# Patient Record
Sex: MALE | Race: BLACK OR AFRICAN AMERICAN | NOT HISPANIC OR LATINO | Employment: FULL TIME | URBAN - METROPOLITAN AREA
[De-identification: names, ages, dates, MRNs, and addresses within clinical notes are randomized per-mention and may not be internally consistent; named-entity substitution may affect disease eponyms.]

---

## 2024-02-05 ENCOUNTER — APPOINTMENT (OUTPATIENT)
Dept: RADIOLOGY | Facility: HOSPITAL | Age: 34
End: 2024-02-05
Payer: MEDICAID

## 2024-02-05 ENCOUNTER — HOSPITAL ENCOUNTER (EMERGENCY)
Facility: HOSPITAL | Age: 34
Discharge: LEFT BEFORE DISCHARGE | End: 2024-02-05
Payer: MEDICAID

## 2024-02-05 VITALS
WEIGHT: 150 LBS | BODY MASS INDEX: 24.99 KG/M2 | TEMPERATURE: 97.7 F | RESPIRATION RATE: 17 BRPM | HEART RATE: 63 BPM | DIASTOLIC BLOOD PRESSURE: 79 MMHG | HEIGHT: 65 IN | OXYGEN SATURATION: 96 % | SYSTOLIC BLOOD PRESSURE: 118 MMHG

## 2024-02-05 DIAGNOSIS — R10.84 ABDOMINAL PAIN, GENERALIZED: Primary | ICD-10-CM

## 2024-02-05 LAB
ALBUMIN SERPL BCP-MCNC: 4.4 G/DL (ref 3.4–5)
ALP SERPL-CCNC: 84 U/L (ref 33–120)
ALT SERPL W P-5'-P-CCNC: 20 U/L (ref 10–52)
ANION GAP SERPL CALC-SCNC: 11 MMOL/L (ref 10–20)
APPEARANCE UR: CLEAR
AST SERPL W P-5'-P-CCNC: 18 U/L (ref 9–39)
BASOPHILS # BLD AUTO: 0.02 X10*3/UL (ref 0–0.1)
BASOPHILS NFR BLD AUTO: 0.2 %
BILIRUB SERPL-MCNC: 0.9 MG/DL (ref 0–1.2)
BILIRUB UR STRIP.AUTO-MCNC: NEGATIVE MG/DL
BUN SERPL-MCNC: 11 MG/DL (ref 6–23)
CALCIUM SERPL-MCNC: 9.6 MG/DL (ref 8.6–10.6)
CHLORIDE SERPL-SCNC: 107 MMOL/L (ref 98–107)
CO2 SERPL-SCNC: 29 MMOL/L (ref 21–32)
COLOR UR: YELLOW
CREAT SERPL-MCNC: 0.84 MG/DL (ref 0.5–1.3)
EGFRCR SERPLBLD CKD-EPI 2021: >90 ML/MIN/1.73M*2
EOSINOPHIL # BLD AUTO: 0.62 X10*3/UL (ref 0–0.7)
EOSINOPHIL NFR BLD AUTO: 7.7 %
ERYTHROCYTE [DISTWIDTH] IN BLOOD BY AUTOMATED COUNT: 11.9 % (ref 11.5–14.5)
GLUCOSE SERPL-MCNC: 81 MG/DL (ref 74–99)
GLUCOSE UR STRIP.AUTO-MCNC: NEGATIVE MG/DL
HCT VFR BLD AUTO: 47.8 % (ref 41–52)
HGB BLD-MCNC: 17.1 G/DL (ref 13.5–17.5)
HIV 1+2 AB+HIV1 P24 AG SERPL QL IA: NONREACTIVE
IMM GRANULOCYTES # BLD AUTO: 0.02 X10*3/UL (ref 0–0.7)
IMM GRANULOCYTES NFR BLD AUTO: 0.2 % (ref 0–0.9)
KETONES UR STRIP.AUTO-MCNC: NEGATIVE MG/DL
LEUKOCYTE ESTERASE UR QL STRIP.AUTO: ABNORMAL
LIPASE SERPL-CCNC: 9 U/L (ref 9–82)
LYMPHOCYTES # BLD AUTO: 1.51 X10*3/UL (ref 1.2–4.8)
LYMPHOCYTES NFR BLD AUTO: 18.8 %
MCH RBC QN AUTO: 30.4 PG (ref 26–34)
MCHC RBC AUTO-ENTMCNC: 35.8 G/DL (ref 32–36)
MCV RBC AUTO: 85 FL (ref 80–100)
MONOCYTES # BLD AUTO: 0.75 X10*3/UL (ref 0.1–1)
MONOCYTES NFR BLD AUTO: 9.3 %
MUCOUS THREADS #/AREA URNS AUTO: ABNORMAL /LPF
NEUTROPHILS # BLD AUTO: 5.12 X10*3/UL (ref 1.2–7.7)
NEUTROPHILS NFR BLD AUTO: 63.8 %
NITRITE UR QL STRIP.AUTO: NEGATIVE
NRBC BLD-RTO: 0 /100 WBCS (ref 0–0)
PH UR STRIP.AUTO: 6 [PH]
PLATELET # BLD AUTO: 201 X10*3/UL (ref 150–450)
POTASSIUM SERPL-SCNC: 4 MMOL/L (ref 3.5–5.3)
PROT SERPL-MCNC: 7.1 G/DL (ref 6.4–8.2)
PROT UR STRIP.AUTO-MCNC: NEGATIVE MG/DL
RBC # BLD AUTO: 5.62 X10*6/UL (ref 4.5–5.9)
RBC # UR STRIP.AUTO: NEGATIVE /UL
RBC #/AREA URNS AUTO: ABNORMAL /HPF
SODIUM SERPL-SCNC: 143 MMOL/L (ref 136–145)
SP GR UR STRIP.AUTO: 1.01
TREPONEMA PALLIDUM IGG+IGM AB [PRESENCE] IN SERUM OR PLASMA BY IMMUNOASSAY: NONREACTIVE
UROBILINOGEN UR STRIP.AUTO-MCNC: <2 MG/DL
WBC # BLD AUTO: 8 X10*3/UL (ref 4.4–11.3)
WBC #/AREA URNS AUTO: ABNORMAL /HPF

## 2024-02-05 PROCEDURE — 74177 CT ABD & PELVIS W/CONTRAST: CPT | Performed by: RADIOLOGY

## 2024-02-05 PROCEDURE — 36415 COLL VENOUS BLD VENIPUNCTURE: CPT

## 2024-02-05 PROCEDURE — 2500000004 HC RX 250 GENERAL PHARMACY W/ HCPCS (ALT 636 FOR OP/ED): Mod: SE

## 2024-02-05 PROCEDURE — 81003 URINALYSIS AUTO W/O SCOPE: CPT

## 2024-02-05 PROCEDURE — 99284 EMERGENCY DEPT VISIT MOD MDM: CPT | Mod: 25

## 2024-02-05 PROCEDURE — 85025 COMPLETE CBC W/AUTO DIFF WBC: CPT

## 2024-02-05 PROCEDURE — 74177 CT ABD & PELVIS W/CONTRAST: CPT

## 2024-02-05 PROCEDURE — 87086 URINE CULTURE/COLONY COUNT: CPT

## 2024-02-05 PROCEDURE — 2550000001 HC RX 255 CONTRASTS: Mod: SE

## 2024-02-05 PROCEDURE — 87800 DETECT AGNT MULT DNA DIREC: CPT

## 2024-02-05 PROCEDURE — 96374 THER/PROPH/DIAG INJ IV PUSH: CPT | Mod: 59

## 2024-02-05 PROCEDURE — 86780 TREPONEMA PALLIDUM: CPT

## 2024-02-05 PROCEDURE — 80053 COMPREHEN METABOLIC PANEL: CPT

## 2024-02-05 PROCEDURE — 87389 HIV-1 AG W/HIV-1&-2 AB AG IA: CPT

## 2024-02-05 PROCEDURE — 83690 ASSAY OF LIPASE: CPT

## 2024-02-05 RX ORDER — MORPHINE SULFATE 4 MG/ML
4 INJECTION INTRAVENOUS ONCE
Status: COMPLETED | OUTPATIENT
Start: 2024-02-05 | End: 2024-02-05

## 2024-02-05 RX ADMIN — MORPHINE SULFATE 4 MG: 4 INJECTION INTRAVENOUS at 15:07

## 2024-02-05 RX ADMIN — IOHEXOL 90 ML: 350 INJECTION, SOLUTION INTRAVENOUS at 15:07

## 2024-02-05 ASSESSMENT — PAIN SCALES - GENERAL: PAINLEVEL_OUTOF10: 10 - WORST POSSIBLE PAIN

## 2024-02-05 ASSESSMENT — PAIN - FUNCTIONAL ASSESSMENT: PAIN_FUNCTIONAL_ASSESSMENT: 0-10

## 2024-02-05 ASSESSMENT — COLUMBIA-SUICIDE SEVERITY RATING SCALE - C-SSRS
6. HAVE YOU EVER DONE ANYTHING, STARTED TO DO ANYTHING, OR PREPARED TO DO ANYTHING TO END YOUR LIFE?: NO
1. IN THE PAST MONTH, HAVE YOU WISHED YOU WERE DEAD OR WISHED YOU COULD GO TO SLEEP AND NOT WAKE UP?: NO
2. HAVE YOU ACTUALLY HAD ANY THOUGHTS OF KILLING YOURSELF?: NO

## 2024-02-05 NOTE — ED PROVIDER NOTES
Emergency Department Encounter  [facility] EMERGENCY MEDICINE    Patient: Mumtaz Romo  MRN: [id number]  : 1990  Date of Evaluation: 2024  ED Provider: Mayco Turcios PA-C    Chief Complaint       Chief Complaint   Patient presents with    Abdominal Pain     [place]    (Location/Symptom, Timing/Onset, Context/Setting, Quality, Duration, Modifying Factors, Severity) Note limiting factors.     Mumtaz Romo is a 33 y.o. male who presents to the emergency department for abdominal pain that started just prior to arrival.  Most abdominal pain is periumbilical region.  Describes the pain as sharp.  Denies any nausea, vomiting, fever, chills.  Denies any previous abdominal surgeries.  Patient is also here for STD testing.  Denies any urethral discharge, dysuria, frequency, genital rashes.  Patient just wants to be tested.    Limitations to History: none  Records reviewed: EMR    Past History   No past medical history on file.  Past Surgical History:   Procedure Laterality Date    OTHER SURGICAL HISTORY  10/02/2020    No history of surgery     Social History     Socioeconomic History    Marital status: Single     Spouse name: Not on file    Number of children: Not on file    Years of education: Not on file    Highest education level: Not on file   Occupational History    Not on file   Tobacco Use    Smoking status: Not on file    Smokeless tobacco: Not on file   Substance and Sexual Activity    Alcohol use: Not on file    Drug use: Not on file    Sexual activity: Not on file   Other Topics Concern    Not on file   Social History Narrative    Not on file     Social Determinants of Health     Financial Resource Strain: Not on file   Food Insecurity: Not on file   Transportation Needs: Not on file   Physical Activity: Not on file   Stress: Not on file   Social Connections: Not on file   Intimate Partner Violence: Not on file   Housing Stability: Not on file         Medications/Allergies      Previous Medications    No medications on file     Allergies   Allergen Reactions    Egg Derived Swelling     States throat swells up.     States throat swells up.    Shellfish Containing Products Shortness of breath and Swelling     Not all shellfish but shrimp for sure.    Acetaminophen Hives    Ibuprofen Hives    Egg Unknown    Shellfish Derived Swelling        Physical Exam       ED Triage Vitals [02/05/24 1347]   Temperature Heart Rate Respirations BP   36.5 °C (97.7 °F) 63 17 118/79      Pulse Ox Temp Source Heart Rate Source Patient Position   96 % Temporal Monitor --      BP Location FiO2 (%)     -- --       Physical Exam  GENERAL APPEARANCE: Awake and alert. Cooperative.   HEENT: Normocephalic. Atraumatic. Sclera anicteric. Tolerates saliva. No trismus.   NECK: Supple. Trachea midline.   CARDIO: Normal rate. Radial pulses symmetrical and palpable  LUNGS: Respirations unlabored. CTAB.  ABDOMEN: Soft. Non-distended.  Most pain in periumbilical region of abdomen.  No peritoneal signs or guarding.  MUSCULOSKELETAL: No acute deformities.   SKIN: Warm and dry.   NEUROLOGICAL: No gross facial drooping. No obvious neurologic deficits.  strength symmetrical. Moves all 4 extremities spontaneously.       Diagnostics   Labs:  Labs Reviewed   URINALYSIS WITH REFLEX CULTURE AND MICROSCOPIC - Abnormal       Result Value    Color, Urine Yellow      Appearance, Urine Clear      Specific Gravity, Urine 1.014      pH, Urine 6.0      Protein, Urine NEGATIVE      Glucose, Urine NEGATIVE      Blood, Urine NEGATIVE      Ketones, Urine NEGATIVE      Bilirubin, Urine NEGATIVE      Urobilinogen, Urine <2.0      Nitrite, Urine NEGATIVE      Leukocyte Esterase, Urine TRACE (*)    MICROSCOPIC ONLY, URINE - Abnormal    WBC, Urine 6-10 (*)     RBC, Urine NONE      Mucus, Urine 1+     COMPREHENSIVE METABOLIC PANEL - Normal    Glucose 81      Sodium 143      Potassium 4.0      Chloride 107      Bicarbonate 29      Anion Gap 11       Urea Nitrogen 11      Creatinine 0.84      eGFR >90      Calcium 9.6      Albumin 4.4      Alkaline Phosphatase 84      Total Protein 7.1      AST 18      Bilirubin, Total 0.9      ALT 20     LIPASE - Normal    Lipase 9      Narrative:     Venipuncture immediately after or during the administration of Metamizole may lead to falsely low results. Testing should be performed immediately prior to Metamizole dosing.   SYPHILIS SCREENING WITH REFLEX - Normal    Syphilis Total Ab Nonreactive     HIV 1/2 ANTIGEN/ANTIBODY SCREEN WIH REFLEX TO CONFIRMATION - Normal    HIV 1/2 Antigen/Antibody Screen with Reflex to Confirmation Nonreactive      Narrative:     HIV Ag/Ab screen is performed using the Siemens [employer] HIV Ag/Ab Combo assay which detects the presence of HIV p24 antigen as well as antibodies to HIV-1 (Group M and O) and HIV-2.  No laboratory evidence of HIV infection. If acute HIV infection is suspected, consider testing for HIV RNA by PCR (viral load).   URINE CULTURE   CBC WITH AUTO DIFFERENTIAL    WBC 8.0      nRBC 0.0      RBC 5.62      Hemoglobin 17.1      Hematocrit 47.8      MCV 85      MCH 30.4      MCHC 35.8      RDW 11.9      Platelets 201      Neutrophils % 63.8      Immature Granulocytes %, Automated 0.2      Lymphocytes % 18.8      Monocytes % 9.3      Eosinophils % 7.7      Basophils % 0.2      Neutrophils Absolute 5.12      Immature Granulocytes Absolute, Automated 0.02      Lymphocytes Absolute 1.51      Monocytes Absolute 0.75      Eosinophils Absolute 0.62      Basophils Absolute 0.02     URINALYSIS WITH REFLEX CULTURE AND MICROSCOPIC    Narrative:     The following orders were created for panel order Urinalysis with Reflex Culture and Microscopic.  Procedure                               Abnormality         Status                     ---------                               -----------         ------                     Urinalysis with Reflex C...[[id number]]  Abnormal            Final result                Extra Urine Gray Tube[[id number]]                            In process                   Please view results for these tests on the individual orders.   C. TRACHOMATIS + N. GONORRHOEAE, AMPLIFIED   EXTRA URINE GRAY TUBE     Radiographs:  CT abdomen pelvis w IV contrast   Final Result   1. Fluid-filled small bowel loops extending up to the ascending colon   involving the appendix with trace abdominopelvic ascites may   represent underlying enterocolitis.   2. Additional incidental findings as detailed above.        I personally reviewed the images/study and I agree with the findings   as stated by Dr. Diaz Tomlin. This study was interpreted at [place], [place].        MACRO:   None.        Signed by: Dio Delgado 2/5/2024 4:17 PM   Dictation workstation:   [id number]            EMERGENCY DEPARTMENT COURSE and DIFFERENTIAL DIAGNOSIS/MDM/PLAN:   Mumtaz Romo is a 33 y.o. male who presented to the emergency department for abdominal pain that started just prior to arrival.  Patient is also here for STD testing.  Denies any urinary symptoms. Differential diagnosis included appendicitis, cholecystitis, pancreatitis, UTI, STD, gastroenteritis. Pt given morphine. Our workup consisted of ordering/reviewing CBC, CMP, lipase, UA, STD testing, CT abdomen/pelvis and showed no leukocytosis or anemia.  Lipase normal.  Urinalysis shows leukocytes and white blood cells.  Will send for culture.  HIV nonreactive.  Syphilis nonreactive.  CT abdomen/pelvis showed fluid-filled small bowel loops extending up to the ascending colon involving the appendix with trace abdominopelvic ascites may represent underlying enterocolitis.    Patient presented with abdominal pain.  CT was concerning for fluid-filled small bowel loops extending up to the ascending colon involving the appendix.  Patient left prior to reassessment.  Patient had no leukocytosis and no right lower quadrant  abdominal pain on my exam, therefore, lower suspicion for appendicitis at this time. Symptoms likely due to colitis.  Will call patient and give him strict return precautions. I told him to return if he starts having increasing abdominal pain, especially in right lower quadrant of abdomen, fevers/chills and intractable nausea/vomiting.  Patient said he left because he had to go to work.  Patient said he would return if pain got worse or symptoms got worse.    Final Diagnosis:   1. Abdominal pain, generalized        Medications   morphine injection 4 mg (4 mg intravenous Given 2/5/24 1507)   iohexol (OMNIPaque) 350 mg iodine/mL solution 90 mL (90 mL intravenous Given 2/5/24 1507)           CONSULTS:  None    PROCEDURES:  Unless otherwise noted below, none     Procedures    DISPOSITION/PLAN  Eloped 02/05/2024 04:26:40 PM    PATIENT REFERRED TO:  No follow-up provider specified.    DISCHARGE MEDICATIONS:  New Prescriptions    No medications on file     @[facility]([id number]:LAST:1)@    (Please note:  Portions of this note were completed with a voice recognition program.  Efforts were made to edit the dictations but occasionally words and phrases are mis-transcribed.)  Form v2016.J.[id number]            Mayco Turcios PA-C  02/05/24 1841

## 2024-02-06 LAB
BACTERIA UR CULT: NORMAL
C TRACH RRNA SPEC QL NAA+PROBE: NEGATIVE
HOLD SPECIMEN: NORMAL
N GONORRHOEA DNA SPEC QL PROBE+SIG AMP: NEGATIVE

## 2024-09-08 ENCOUNTER — CLINICAL SUPPORT (OUTPATIENT)
Dept: EMERGENCY MEDICINE | Facility: HOSPITAL | Age: 34
End: 2024-09-08
Payer: MEDICAID

## 2024-09-08 ENCOUNTER — APPOINTMENT (OUTPATIENT)
Dept: RADIOLOGY | Facility: HOSPITAL | Age: 34
End: 2024-09-08
Payer: MEDICAID

## 2024-09-08 ENCOUNTER — HOSPITAL ENCOUNTER (OUTPATIENT)
Facility: HOSPITAL | Age: 34
Setting detail: OBSERVATION
Discharge: LEFT BEFORE DISCHARGE | End: 2024-09-08
Attending: EMERGENCY MEDICINE | Admitting: EMERGENCY MEDICINE
Payer: MEDICAID

## 2024-09-08 VITALS
DIASTOLIC BLOOD PRESSURE: 75 MMHG | SYSTOLIC BLOOD PRESSURE: 135 MMHG | HEART RATE: 97 BPM | WEIGHT: 149 LBS | TEMPERATURE: 98.6 F | RESPIRATION RATE: 18 BRPM | OXYGEN SATURATION: 93 % | BODY MASS INDEX: 24.79 KG/M2

## 2024-09-08 DIAGNOSIS — J45.901 MILD ASTHMA WITH EXACERBATION, UNSPECIFIED WHETHER PERSISTENT (HHS-HCC): Primary | ICD-10-CM

## 2024-09-08 DIAGNOSIS — S46.009D UNSPECIFIED INJURY OF MUSCLE(S) AND TENDON(S) OF THE ROTATOR CUFF OF UNSPECIFIED SHOULDER, SUBSEQUENT ENCOUNTER: ICD-10-CM

## 2024-09-08 PROBLEM — J44.1 ASTHMA EXACERBATION IN COPD: Status: ACTIVE | Noted: 2024-09-08

## 2024-09-08 LAB
FLUAV RNA RESP QL NAA+PROBE: NOT DETECTED
FLUBV RNA RESP QL NAA+PROBE: NOT DETECTED
HIV 1+2 AB+HIV1 P24 AG SERPL QL IA: NONREACTIVE
SARS-COV-2 RNA RESP QL NAA+PROBE: NOT DETECTED

## 2024-09-08 PROCEDURE — 2500000001 HC RX 250 WO HCPCS SELF ADMINISTERED DRUGS (ALT 637 FOR MEDICARE OP): Mod: SE | Performed by: NURSE PRACTITIONER

## 2024-09-08 PROCEDURE — 71046 X-RAY EXAM CHEST 2 VIEWS: CPT | Performed by: RADIOLOGY

## 2024-09-08 PROCEDURE — 87636 SARSCOV2 & INF A&B AMP PRB: CPT

## 2024-09-08 PROCEDURE — 87389 HIV-1 AG W/HIV-1&-2 AB AG IA: CPT | Performed by: EMERGENCY MEDICINE

## 2024-09-08 PROCEDURE — 2500000004 HC RX 250 GENERAL PHARMACY W/ HCPCS (ALT 636 FOR OP/ED): Mod: SE | Performed by: NURSE PRACTITIONER

## 2024-09-08 PROCEDURE — 2500000004 HC RX 250 GENERAL PHARMACY W/ HCPCS (ALT 636 FOR OP/ED): Mod: SE

## 2024-09-08 PROCEDURE — 99285 EMERGENCY DEPT VISIT HI MDM: CPT | Mod: 25

## 2024-09-08 PROCEDURE — 93005 ELECTROCARDIOGRAM TRACING: CPT

## 2024-09-08 PROCEDURE — 2500000004 HC RX 250 GENERAL PHARMACY W/ HCPCS (ALT 636 FOR OP/ED): Mod: SE | Performed by: EMERGENCY MEDICINE

## 2024-09-08 PROCEDURE — 2500000002 HC RX 250 W HCPCS SELF ADMINISTERED DRUGS (ALT 637 FOR MEDICARE OP, ALT 636 FOR OP/ED): Mod: SE | Performed by: NURSE PRACTITIONER

## 2024-09-08 PROCEDURE — 2500000002 HC RX 250 W HCPCS SELF ADMINISTERED DRUGS (ALT 637 FOR MEDICARE OP, ALT 636 FOR OP/ED): Mod: SE

## 2024-09-08 PROCEDURE — 96365 THER/PROPH/DIAG IV INF INIT: CPT

## 2024-09-08 PROCEDURE — G0378 HOSPITAL OBSERVATION PER HR: HCPCS

## 2024-09-08 PROCEDURE — 2500000002 HC RX 250 W HCPCS SELF ADMINISTERED DRUGS (ALT 637 FOR MEDICARE OP, ALT 636 FOR OP/ED): Mod: SE | Performed by: EMERGENCY MEDICINE

## 2024-09-08 PROCEDURE — 94640 AIRWAY INHALATION TREATMENT: CPT

## 2024-09-08 PROCEDURE — 71046 X-RAY EXAM CHEST 2 VIEWS: CPT

## 2024-09-08 PROCEDURE — 36415 COLL VENOUS BLD VENIPUNCTURE: CPT | Performed by: EMERGENCY MEDICINE

## 2024-09-08 PROCEDURE — 99285 EMERGENCY DEPT VISIT HI MDM: CPT | Performed by: EMERGENCY MEDICINE

## 2024-09-08 PROCEDURE — 2500000005 HC RX 250 GENERAL PHARMACY W/O HCPCS: Mod: SE | Performed by: EMERGENCY MEDICINE

## 2024-09-08 RX ORDER — ALBUTEROL SULFATE 0.83 MG/ML
2.5 SOLUTION RESPIRATORY (INHALATION) EVERY 2 HOUR PRN
Status: DISCONTINUED | OUTPATIENT
Start: 2024-09-08 | End: 2024-09-08

## 2024-09-08 RX ORDER — GUAIFENESIN 100 MG/5ML
200 SOLUTION ORAL EVERY 4 HOURS PRN
Status: DISCONTINUED | OUTPATIENT
Start: 2024-09-08 | End: 2024-09-09 | Stop reason: HOSPADM

## 2024-09-08 RX ORDER — BUDESONIDE 0.5 MG/2ML
0.5 INHALANT ORAL
Status: DISCONTINUED | OUTPATIENT
Start: 2024-09-08 | End: 2024-09-09 | Stop reason: HOSPADM

## 2024-09-08 RX ORDER — BENZONATATE 100 MG/1
100 CAPSULE ORAL 3 TIMES DAILY PRN
Status: DISCONTINUED | OUTPATIENT
Start: 2024-09-08 | End: 2024-09-09 | Stop reason: HOSPADM

## 2024-09-08 RX ORDER — IPRATROPIUM BROMIDE AND ALBUTEROL SULFATE 2.5; .5 MG/3ML; MG/3ML
SOLUTION RESPIRATORY (INHALATION)
Status: COMPLETED
Start: 2024-09-08 | End: 2024-09-08

## 2024-09-08 RX ORDER — ALBUTEROL SULFATE 0.83 MG/ML
2.5 SOLUTION RESPIRATORY (INHALATION) ONCE
Status: COMPLETED | OUTPATIENT
Start: 2024-09-08 | End: 2024-09-08

## 2024-09-08 RX ORDER — ALBUTEROL SULFATE 0.83 MG/ML
2.5 SOLUTION RESPIRATORY (INHALATION)
Status: DISCONTINUED | OUTPATIENT
Start: 2024-09-08 | End: 2024-09-09 | Stop reason: HOSPADM

## 2024-09-08 RX ORDER — IPRATROPIUM BROMIDE AND ALBUTEROL SULFATE 2.5; .5 MG/3ML; MG/3ML
3 SOLUTION RESPIRATORY (INHALATION)
Status: DISCONTINUED | OUTPATIENT
Start: 2024-09-08 | End: 2024-09-09 | Stop reason: HOSPADM

## 2024-09-08 RX ORDER — IPRATROPIUM BROMIDE AND ALBUTEROL SULFATE 2.5; .5 MG/3ML; MG/3ML
3 SOLUTION RESPIRATORY (INHALATION) EVERY 20 MIN
Status: COMPLETED | OUTPATIENT
Start: 2024-09-08 | End: 2024-09-08

## 2024-09-08 RX ORDER — MAGNESIUM SULFATE HEPTAHYDRATE 40 MG/ML
2 INJECTION, SOLUTION INTRAVENOUS ONCE
Status: COMPLETED | OUTPATIENT
Start: 2024-09-08 | End: 2024-09-08

## 2024-09-08 RX ORDER — PREDNISONE 20 MG/1
40 TABLET ORAL ONCE
Status: COMPLETED | OUTPATIENT
Start: 2024-09-08 | End: 2024-09-08

## 2024-09-08 RX ORDER — LIDOCAINE 560 MG/1
1 PATCH PERCUTANEOUS; TOPICAL; TRANSDERMAL ONCE
Status: DISCONTINUED | OUTPATIENT
Start: 2024-09-08 | End: 2024-09-09 | Stop reason: HOSPADM

## 2024-09-08 RX ORDER — PREDNISONE 20 MG/1
40 TABLET ORAL DAILY
Status: DISCONTINUED | OUTPATIENT
Start: 2024-09-08 | End: 2024-09-09 | Stop reason: HOSPADM

## 2024-09-08 ASSESSMENT — LIFESTYLE VARIABLES
EVER FELT BAD OR GUILTY ABOUT YOUR DRINKING: NO
HAVE YOU EVER FELT YOU SHOULD CUT DOWN ON YOUR DRINKING: NO
HAVE PEOPLE ANNOYED YOU BY CRITICIZING YOUR DRINKING: NO
EVER HAD A DRINK FIRST THING IN THE MORNING TO STEADY YOUR NERVES TO GET RID OF A HANGOVER: NO
TOTAL SCORE: 0

## 2024-09-08 ASSESSMENT — COLUMBIA-SUICIDE SEVERITY RATING SCALE - C-SSRS
6. HAVE YOU EVER DONE ANYTHING, STARTED TO DO ANYTHING, OR PREPARED TO DO ANYTHING TO END YOUR LIFE?: NO
2. HAVE YOU ACTUALLY HAD ANY THOUGHTS OF KILLING YOURSELF?: NO
1. IN THE PAST MONTH, HAVE YOU WISHED YOU WERE DEAD OR WISHED YOU COULD GO TO SLEEP AND NOT WAKE UP?: NO

## 2024-09-08 NOTE — ED TRIAGE NOTES
Hx of asthma, over using his neb and inhalers. Never been intubated. LS diffuse wheeze and rhonchi. Pt has increased WOB, speaking in 1-2 word sentences.

## 2024-09-08 NOTE — ED PROVIDER NOTES
Emergency Department Provider Note        History of Present Illness     History provided by: Patient  Limitations to History: None  External Records Reviewed with Brief Summary: Prior ED visits    HPI:  Mumtaz Romo is a 34 y.o. male with past medical history of asthma presents to the emergency department for shortness of breath.  Patient reports that he has been using his nebs and inhalers without improvement of symptoms.  Patient reports he has never been intubated.  Patient denies history of blood clots. He reports productive cough, shortness of breath symptoms started yesterday. He reports chest pain with coughing. Patient denies fever, chest pain, abdominal pain.      Physical Exam   Triage vitals:  T 36.9 °C (98.4 °F)  HR (!) 119  /75  RR (!) 30  O2 (!) 93 % None (Room air)    General: Awake, alert, in no acute distress  Eyes: Gaze conjugate.  No scleral icterus or injection  HENT: Normo-cephalic, atraumatic. No stridor  CV: Tachycardic rate, regular rhythm. Radial pulses 2+ bilaterally  Resp: Breathing labored, speaking in 1-2 word sentences.  Increased work of breathing.  Notable wheezing diffusely and rhonchi.  GI: Soft, non-distended, non-tender. No rebound or guarding.  MSK/Extremities: No gross bony deformities. Moving all extremities  Skin: Warm. Appropriate color  Neuro: Alert. Oriented. Face symmetric. Speech is fluent.  Gross strength and sensation intact in b/l UE and LEs  Psych: Appropriate mood and affect    Medical Decision Making & ED Course   Medical Decision Makin y.o. male here for shortness of breath.  Notable wheezing, rhonchi, increased work of breathing.  Likely asthma exacerbation.  Patient has had albuterol at home, explains tachycardia and also patient slightly hypoxic.  At this time, likely cause is asthma exacerbation, less concern for PE at this time.  Patient given meds for symptomatic treatment. IV magnesium ordered. Patient has improved breath sounds, no  longer tachypneic and decreased work of breathing, able to speak in full sentences.    CXR ordered to rule out pneumonia. Viral panels ordered to rule out viral infection.  Patient reports he would like a referral to sports medicine for his rotator cuff injury, placed referral for patient.  He reports has an appointment to establish PCP tomorrow.  Patient was given numbers for sports medicine, primary care doctor.  Patient reports symptoms not improved and has continued coughing.  He reports that he is amenable to admission as long as he is able to leave before 11 PM for his primary care appointment.  Patient was admitted to CDU.    ----  Differential diagnoses considered include but are not limited to: asthma, bronchitis, pneumonia, pneumo/hemothorax, pleural effusion, valve disorder      Social Determinants of Health which Significantly Impact Care: None identified     EKG Independent Interpretation: EKG interpreted by myself. Please see ED Course for full interpretation.    Independent Result Review and Interpretation: Relevant laboratory and radiographic results were reviewed and independently interpreted by myself.  As necessary, they are commented on in the ED Course.    Chronic conditions affecting the patient's care: As documented above in [facility]    The patient was discussed with the following consultants/services: None    Care Considerations: As documented above in [facility]    ED Course:  ED Course as of 09/08/24 1814   Sun Sep 08, 2024   1618 CXR:  1.  No evidence of acute cardiopulmonary process. [AT]   1628 Flu A Result: Not Detected [AT]   1628 Flu B Result: Not Detected [AT]   1628 Coronavirus 2019, PCR: Not Detected [AT]   1628 CXR: 1.  No evidence of acute cardiopulmonary process. No pneumothorax present [AT]      ED Course User Index  [AT] Concepcion Mcallister MD         Diagnoses as of 09/08/24 1814   Mild asthma with exacerbation, unspecified whether persistent ([facility])   Unspecified injury of muscle(s) and  tendon(s) of the rotator cuff of unspecified shoulder, subsequent encounter     Disposition   As a result of their workup, the patient will require admission to the hospital.  The patient was informed of his diagnosis.  The patient was given the opportunity to ask questions and I answered them. The patient agreed to be admitted to the hospital.    Procedures   Procedures    Patient seen and discussed with ED attending physician.    Concepcion Mcallister MD  Emergency Medicine     Concepcion Mcallister MD  Resident  09/08/24 8893

## 2024-09-08 NOTE — H&P
History and Physical  [facility] EMERGENCY MEDICINE  Patient: Mumtaz Romo  MRN: [id number]  : 1990  Date of Evaluation: 2024  ED Provider: SANTIGAO Prieto, KRISTOFER      Limitations to history: None  Independent Historian: Yes  External Records Reviewed: EMR       Patient History:  Mumtaz Romo is a 34 y.o. male with past medical history significant for asthma, marijuana use, depression, PTSD and chronic pain presented to the ED with exp wheeze and dyspnea.   He has been using his nebs and MDIs at home with no significant improvement therefore he came to the ED. He has never been intubated.  He stated that the only medications he takes daily is his nebs but review the EMR showed Seroquel 400 mg & mirtazapine 45 mg both at [facility] were prescribed in July but he does not take any daily medications.   In the ED , he was given aerosol treatments, magnesium and Prednisone with a negative chest x-ray. He was admitted to the CDU for further management.   Upon my exam in the blue pod, the patient is on the phone and saying that no one has done anything for him and he still can't breath and he'd rather just leave and go to the streets and die. He is quite angry and would not stop talking on the phone while I wheeled him down to the CDU. He made additional comments about wanting to harm himself.     The acute evaluation included:  Orders Placed This Encounter   Procedures    XR chest 2 views    Sars-CoV-2 PCR    Influenza A, and B PCR    HIV 1/2 Antigen/Antibody Screen with Reflex to Confirmation    Referral to Sports Medicine    Referral to Primary Care    ECG 12 lead       Upon admission to the Clinical Decision Unit, appears dyspneic but is talking on the phone non-stop.     Past History   No past medical history on file.  Past Surgical History:   Procedure Laterality Date    OTHER SURGICAL HISTORY  10/02/2020    No history of surgery     Social History     Socioeconomic History     Marital status: Single     Social Determinants of Health     Financial Resource Strain: At Risk (2024)    [facility] from [facility]    Financial Resource Strain     Financial Resource Strain: 2   Transportation Needs: Not at Risk (2024)    Received from [facility]    Transportation Needs     Transportation: 1   Physical Activity: At Risk (2024)    Received from [facility]    Physical Activity     Physical Activity: 2   Stress: At Risk (2024)    Received from [facility]    Stress     Stress: 2   Social Connections: At Risk (2024)    Received from [facility]    Social Connections     Connectedness: 2   Housing Stability: At Risk (2024)    Received from [facility]    Housing Stability     Housin         Medications/Allergies     Previous Medications    No medications on file     Allergies   Allergen Reactions    Egg Derived Swelling     States throat swells up.     States throat swells up.    Shellfish Containing Products Shortness of breath and Swelling     Not all shellfish but shrimp for sure.    Acetaminophen Hives    Ibuprofen Hives    Egg Unknown    Shellfish Derived Swelling         Review of Systems  All systems reviewed and otherwise negative, except as stated above in HPI.      Physical Exam     Visit Vitals  /74 (BP Location: Left arm, Patient Position: Lying)   Pulse 91   Temp 36.9 °C (98.4 °F)   Resp (!) 22   Wt 67.6 kg (149 lb)   SpO2 (!) 91%   BMI 24.79 kg/m²   BSA 1.76 m²       GENERAL:  The patient appears nourished and normally developed. Vital signs as documented.     HEENT:  Head normocephalic, atraumatic, EOMs intact, PERRLA, Mucous membranes moist. Nares patent without copious rhinorrhea.  No lymphadenopathy.    PULMONARY:  exp wheeze throughout.     CARDIAC:   Normal rate. No murmurs, rubs or gallops    ABDOMEN:  Soft, non-distended, non-tender, BS positive x 4 quadrants, No rebound or guarding, no peritoneal signs, no CVA tenderness, no masses or  organomegaly    MUSCULOSKELETAL:   Able to ambulate, Non edematous, with no obvious deformities. Pulses intact distal    SKIN:   Good color, with no significant rashes.  No pallor.    NEURO:  No obvious neurological deficits, normal sensation and strength bilaterally.  Able to follow commands, NIH 0, CN 2-12 intact.    Psych: Appropriate mood and affect    Consultants  1) none       Impression and Plan  In summary, Mumtza Romo is admitted to the [facility] Clinical Decision Unit for asthma exacerbation . Dr. Anaya is the CDU admission attending.    This patient has been risk-stratified based on available history, physical exam, and related study findings. Admission to the observation status for further diagnosis/treatment/monitoring of asthma  is warranted clinically. This extended period of observation is specifically required to determine the need for hospitalization.     The goals of this admission based on the patient’s clinical problem list are:  1) asthma exacerbation  Scheduled aerosol treatments   Continue with Prednisone   2) made several threatening statements wanting to harm himself upon arrival to the CDU mostly due to being frustrated but obviously there is a concern since he does a Hx of psychiatric issues and is non-compliant with meds   I have reached out to the ED attending and currently awaiting to hear back      We will observe the patient for the following endpoints:   1) symptom improvement    When met, appropriate disposition will be arranged.

## 2024-09-09 LAB
ATRIAL RATE: 73 BPM
P AXIS: 71 DEGREES
P OFFSET: 203 MS
P ONSET: 146 MS
PR INTERVAL: 142 MS
Q ONSET: 217 MS
QRS COUNT: 12 BEATS
QRS DURATION: 82 MS
QT INTERVAL: 348 MS
QTC CALCULATION(BAZETT): 383 MS
QTC FREDERICIA: 371 MS
R AXIS: 76 DEGREES
T AXIS: 19 DEGREES
T OFFSET: 391 MS
VENTRICULAR RATE: 73 BPM

## 2024-09-09 PROCEDURE — 93010 ELECTROCARDIOGRAM REPORT: CPT | Performed by: NURSE PRACTITIONER

## 2024-09-09 NOTE — DISCHARGE SUMMARY
Disposition Note  [facility] CLINICAL DECISION  Patient: Mumtaz Romo  MRN: [id number]  : 1990  Date of Evaluation: 2024  ED Provider: SANTIAGO Reddy      Limitations to history: none  Independent Historian: patient  External Records Reviewed: outside records, inpatient notes, ed records      Subjective:    Mumtaz Romo is a 34 y.o. male has undergone comprehensive diagnostic evaluation and therapeutic management in accordance with the CDU guidelines for asthma exacerbation. Based on the patient's clinical response and diagnostic information during this period of observation, patient left AMA.    The acute evaluation included:  Orders Placed This Encounter   Procedures    XR chest 2 views    Sars-CoV-2 PCR    Influenza A, and B PCR    HIV 1/2 Antigen/Antibody Screen with Reflex to Confirmation    Referral to Sports Medicine    Referral to Primary Care    Adult diet Regular    ECG 12 lead    Send to CDU    Initiate observation status       Results for orders placed or performed during the hospital encounter of 24   Sars-CoV-2 PCR   Result Value Ref Range    Coronavirus 2019, PCR Not Detected Not Detected   Influenza A, and B PCR   Result Value Ref Range    Flu A Result Not Detected Not Detected    Flu B Result Not Detected Not Detected   HIV 1/2 Antigen/Antibody Screen with Reflex to Confirmation   Result Value Ref Range    HIV 1/2 Antigen/Antibody Screen with Reflex to Confirmation Nonreactive Nonreactive            Past History   No past medical history on file.  Past Surgical History:   Procedure Laterality Date    OTHER SURGICAL HISTORY  10/02/2020    No history of surgery     Social History     Socioeconomic History    Marital status: Single     Social Determinants of Health     Financial Resource Strain: At Risk (2024)    Received from [facility]    Financial Resource Strain     Financial Resource Strain: 2   Transportation Needs: Not at Risk  (2024)    Received from [facility]    Transportation Needs     Transportation: 1   Physical Activity: At Risk (2024)    Received from [facility]    Physical Activity     Physical Activity: 2   Stress: At Risk (2024)    Received from [facility]    Stress     Stress: 2   Social Connections: At Risk (2024)    Received from [facility]    Social Connections     Connectedness: 2   Housing Stability: At Risk (2024)    Received from [facility]    Housing Stability     Housin         Medications/Allergies     Previous Medications    No medications on file     Allergies   Allergen Reactions    Egg Derived Swelling     States throat swells up.     States throat swells up.    Shellfish Containing Products Shortness of breath and Swelling     Not all shellfish but shrimp for sure.    Acetaminophen Hives    Ibuprofen Hives    Egg Unknown    Shellfish Derived Swelling         Review of Systems  All systems reviewed and otherwise negative, except as stated above in HPI.  + SOB  + cough  Diagnostics reviewed by Ronel Sandoval, APRN-CNP     Labs:  Results for orders placed or performed during the hospital encounter of 24   Sars-CoV-2 PCR   Result Value Ref Range    Coronavirus 2019, PCR Not Detected Not Detected   Influenza A, and B PCR   Result Value Ref Range    Flu A Result Not Detected Not Detected    Flu B Result Not Detected Not Detected   HIV 1/2 Antigen/Antibody Screen with Reflex to Confirmation   Result Value Ref Range    HIV 1/2 Antigen/Antibody Screen with Reflex to Confirmation Nonreactive Nonreactive     Radiographs:  XR chest 2 views   Final Result   1.  No evidence of acute cardiopulmonary process.                  MACRO:   None        Signed by: Stepan Rodríguez 2024 4:14 PM   Dictation workstation:   [id number]              Physical Exam     Visit Vitals  /75 (BP Location: Left arm, Patient Position: Sitting)   Pulse 97   Temp 37 °C (98.6 °F) (Temporal)   Resp 18   Wt  67.6 kg (149 lb)   SpO2 (!) 93%   BMI 24.79 kg/m²   BSA 1.76 m²         Physical Exam:    Appearance: Alert, oriented , cooperative,  in no acute distress.     Skin: Intact,  dry skin, no lesions, rash, petechiae or purpura.     ENT: Hearing grossly intact. External auditory canals patent, tympanic membranes intact with visible landmarks. Nares patent, mucus membranes moist. Dentition without lesions. Pharynx clear, uvula midline.     Neck: Supple, without meningismus.     Pulmonary: Bilateral inspiratory and expiratory wheezing throughout. No accessory muscle use or stridor.    Cardiac: Normal S1, S2 without murmur, rub, gallop or extrasystole. No JVD, Carotids without bruits.    Abdomen: Soft, nontender, active bowel sounds.  No palpable organomegaly.  No rebound or guarding.     Musculoskeletal: Full range of motion. no pain, edema, or deformity. Pulses full and equal. No cyanosis, clubbing, or edema.    Neurological:  Normal sensation, no weakness, no focal findings identified.    Psychiatric: Appropriate mood and affect.         Impression and Plan    In summary, Mumtaz Romo has been cared for according to the standard [facility] Clinical Decision Unit observation protocol for Acute asthma exacerbation ([facility]). This extended period of observation was specifically required to determine the need for hospitalization. Prior to discharge from observation, the final physical exam is documented above. I have reviewed the results of the labs and imaging that were performed in the ED as well as the CDU.      Significant events during the course of observation based on the goals of the clinical problem list include:   1) Pulse ox dropped to 84-88% with ambulation  2) Patient left AMA    Based on the patient's condition and test results, the patient left AMA.      Dr. Anaya is the CDU disposition attending.      Discharge Diagnosis  Acute asthma exacerbation ([facility])    Issues  Requiring Follow-Up  Left AMA    Discharge Meds     Your medication list      You have not been prescribed any medications.         Test Results Pending At Discharge  Pending Labs       No current pending labs.            Hospital Course  Patient presented to the CDU and was found to be hypoxic at 88%. Oxygen was applied and patient continued to get nebulizer treatments. This evening, patient states he was going to leave. Walking pulse ox was performed and oxygen level was between 84-88%. Patient is alert and oriented x 3. Patient refused to sign the AMA form. Discussed risk of leaving including death, permanent injury, worsening respiratory status. Patient states he is leaving regardless.     Pertinent Physical Exam At Time of Discharge  Physical Exam    Left AMA  Outpatient Follow-Up  No future appointments.    Medical Screening Exam: The patient has received a medical screening examination and within reasonable clinical confidence an emergency medical condition was identified and has been stabilized.  Ronel Sandoval, KAREN-CNP

## 2024-09-24 NOTE — PROGRESS NOTES
Subjective      Chief Complaint   Patient presents with    Left Shoulder - Pain        Past Surgical History:   Procedure Laterality Date    OTHER SURGICAL HISTORY  10/02/2020    No history of surgery        HPI  This 34 year old patient presents today with left shoulder pain (4/10). The patient states that the left shoulder pain has been present for years. The patient played football in high school and injured his shoulder at that time. He has been experiencing intermittent chronic pain since that time. The patient states that the left shoulder pain is worse with and aggravated by reaching and lifting. The patient states that this shoulder pain is disabling and presents today to discuss further options. The patient states that they have tried tylenol with no relief. He is allergic to NSAIDS.  He had previously attended physical therapy in [place] several years ago with no relief in symptoms.     CARDIOLOGY:   Negative for chest pain, shortness of breath.   RESPIRATORY:   Negative for chest pain, shortness of breath.   MUSCULOSKELETAL:   See HPI for details.   NEUROLOGY:   Negative for tingling, numbness, weakness.    Objective      There were no vitals taken for this visit.     SHOULDER EXAM  Constitutional: Appears stated age. No apparent distress  Labored Breathing: No  Psychiatric: Normal mood and effect.   Neurological: alert and oriented x3  Skin: intact  HEENT: No bruising, otorrhea, rhinorrhea.  MUSCULOSKELETAL: Neck: No tenderness. No pain or limitation with range of motion. Back: No tenderness. Straight leg test negative bilaterally. Left shoulder: There is tenderness anteriorly and laterally. Active abduction and active flexion are 0-120 degrees but with pain and guarding. There is pain with and limitation of active and passive internal and external rotation. Comparments are soft. Neurovascular is intact.     AP and lateral x-rays of the left shoulder done and read in office today show no evidence of acute  fracture or bone destruction.     Mumtaz was seen today for pain.  Diagnoses and all orders for this visit:  Chronic left shoulder pain (Primary)  -     Referral to Physical Therapy; Future  -     lidocaine (Lidoderm) 5 % patch; Place 1 patch over 12 hours on the skin once daily. Remove & discard patch within 12 hours or as directed by MD.  Unspecified injury of muscle(s) and tendon(s) of the rotator cuff of unspecified shoulder, subsequent encounter  -     Referral to Sports Medicine  -     Referral to Physical Therapy; Future  -     lidocaine (Lidoderm) 5 % patch; Place 1 patch over 12 hours on the skin once daily. Remove & discard patch within 12 hours or as directed by MD.  Rotator cuff strain, left, initial encounter  -     Referral to Physical Therapy; Future  -     lidocaine (Lidoderm) 5 % patch; Place 1 patch over 12 hours on the skin once daily. Remove & discard patch within 12 hours or as directed by MD.  Options are discussed with the patient in detail. The patient is given a prescription for physical therapy to evaluate and treat with gentle strengthening and ROM exercises with modalities as needed. The patient is instructed regarding activity modification and risk for further injury with falling or trauma, ice, provider directed at home gentle strengthening and ROM exercises, and the appropriate use of Tylenol as needed for pain with its potential adverse reactions and side effects. The patient understands.  Return in 4 weeks for consideration of left shoulder MRI or sooner as needed please note that this report has been produced using speech recognition software.  It may contain errors related to grammar, punctuation or spelling.  Electronically signed, but not reviewed.    Anu Hoskins PA-C

## 2024-09-25 ENCOUNTER — HOSPITAL ENCOUNTER (OUTPATIENT)
Dept: RADIOLOGY | Facility: CLINIC | Age: 34
Discharge: HOME | End: 2024-09-25
Payer: MEDICAID

## 2024-09-25 ENCOUNTER — OFFICE VISIT (OUTPATIENT)
Dept: ORTHOPEDIC SURGERY | Facility: CLINIC | Age: 34
End: 2024-09-25
Payer: MEDICAID

## 2024-09-25 VITALS — WEIGHT: 142 LBS | HEIGHT: 65 IN | BODY MASS INDEX: 23.66 KG/M2

## 2024-09-25 DIAGNOSIS — S46.009D UNSPECIFIED INJURY OF MUSCLE(S) AND TENDON(S) OF THE ROTATOR CUFF OF UNSPECIFIED SHOULDER, SUBSEQUENT ENCOUNTER: ICD-10-CM

## 2024-09-25 DIAGNOSIS — R52 PAIN: ICD-10-CM

## 2024-09-25 DIAGNOSIS — M25.512 CHRONIC LEFT SHOULDER PAIN: Primary | ICD-10-CM

## 2024-09-25 DIAGNOSIS — G89.29 CHRONIC LEFT SHOULDER PAIN: Primary | ICD-10-CM

## 2024-09-25 DIAGNOSIS — S46.012A ROTATOR CUFF STRAIN, LEFT, INITIAL ENCOUNTER: ICD-10-CM

## 2024-09-25 PROCEDURE — 99213 OFFICE O/P EST LOW 20 MIN: CPT | Performed by: PHYSICIAN ASSISTANT

## 2024-09-25 PROCEDURE — 73030 X-RAY EXAM OF SHOULDER: CPT | Mod: LT

## 2024-09-25 PROCEDURE — 3008F BODY MASS INDEX DOCD: CPT | Performed by: PHYSICIAN ASSISTANT

## 2024-09-25 PROCEDURE — 99203 OFFICE O/P NEW LOW 30 MIN: CPT | Performed by: PHYSICIAN ASSISTANT

## 2024-09-25 PROCEDURE — 73030 X-RAY EXAM OF SHOULDER: CPT | Mod: LEFT SIDE | Performed by: ORTHOPAEDIC SURGERY

## 2024-09-25 RX ORDER — LIDOCAINE 50 MG/G
1 PATCH TOPICAL DAILY
Qty: 30 PATCH | Refills: 0 | Status: SHIPPED | OUTPATIENT
Start: 2024-09-25 | End: 2024-10-25

## 2024-09-25 ASSESSMENT — LIFESTYLE VARIABLES
SKIP TO QUESTIONS 9-10: 1
HAVE YOU OR SOMEONE ELSE BEEN INJURED AS A RESULT OF YOUR DRINKING: NO
HOW OFTEN DO YOU HAVE A DRINK CONTAINING ALCOHOL: NEVER
AUDIT-C TOTAL SCORE: 0
AUDIT TOTAL SCORE: 0
HOW OFTEN DO YOU HAVE SIX OR MORE DRINKS ON ONE OCCASION: NEVER
HAS A RELATIVE, FRIEND, DOCTOR, OR ANOTHER HEALTH PROFESSIONAL EXPRESSED CONCERN ABOUT YOUR DRINKING OR SUGGESTED YOU CUT DOWN: NO
HOW OFTEN DURING THE LAST YEAR HAVE YOU BEEN UNABLE TO REMEMBER WHAT HAPPENED THE NIGHT BEFORE BECAUSE YOU HAD BEEN DRINKING: NEVER
HOW OFTEN DURING THE LAST YEAR HAVE YOU HAD A FEELING OF GUILT OR REMORSE AFTER DRINKING: NEVER
HOW OFTEN DURING THE LAST YEAR HAVE YOU NEEDED AN ALCOHOLIC DRINK FIRST THING IN THE MORNING TO GET YOURSELF GOING AFTER A NIGHT OF HEAVY DRINKING: NEVER
HOW OFTEN DURING THE LAST YEAR HAVE YOU FAILED TO DO WHAT WAS NORMALLY EXPECTED FROM YOU BECAUSE OF DRINKING: NEVER
HOW MANY STANDARD DRINKS CONTAINING ALCOHOL DO YOU HAVE ON A TYPICAL DAY: PATIENT DOES NOT DRINK
HOW OFTEN DURING THE LAST YEAR HAVE YOU FOUND THAT YOU WERE NOT ABLE TO STOP DRINKING ONCE YOU HAD STARTED: NEVER

## 2024-09-25 ASSESSMENT — PAIN - FUNCTIONAL ASSESSMENT: PAIN_FUNCTIONAL_ASSESSMENT: 0-10

## 2024-09-25 ASSESSMENT — PAIN DESCRIPTION - DESCRIPTORS: DESCRIPTORS: SHARP

## 2024-09-25 ASSESSMENT — PATIENT HEALTH QUESTIONNAIRE - PHQ9
1. LITTLE INTEREST OR PLEASURE IN DOING THINGS: SEVERAL DAYS
10. IF YOU CHECKED OFF ANY PROBLEMS, HOW DIFFICULT HAVE THESE PROBLEMS MADE IT FOR YOU TO DO YOUR WORK, TAKE CARE OF THINGS AT HOME, OR GET ALONG WITH OTHER PEOPLE: SOMEWHAT DIFFICULT
2. FEELING DOWN, DEPRESSED OR HOPELESS: SEVERAL DAYS
SUM OF ALL RESPONSES TO PHQ9 QUESTIONS 1 AND 2: 2

## 2024-09-25 ASSESSMENT — ENCOUNTER SYMPTOMS
DEPRESSION: 0
LOSS OF SENSATION IN FEET: 0
OCCASIONAL FEELINGS OF UNSTEADINESS: 0

## 2024-09-25 ASSESSMENT — PAIN SCALES - GENERAL
PAINLEVEL_OUTOF10: 10 - WORST POSSIBLE PAIN
PAINLEVEL: 10-WORST PAIN EVER

## 2024-09-25 NOTE — PATIENT INSTRUCTIONS
Thank you for coming to see us today!     We are going to give you a referral for physical therapy   Please call central scheduling to make this appointment.     Please follow up with us in 4 weeks to consider a left shoulder MRI

## 2024-10-18 ENCOUNTER — APPOINTMENT (OUTPATIENT)
Dept: OCCUPATIONAL THERAPY | Facility: HOSPITAL | Age: 34
End: 2024-10-18
Payer: MEDICAID

## 2024-10-23 ENCOUNTER — APPOINTMENT (OUTPATIENT)
Dept: ORTHOPEDIC SURGERY | Facility: CLINIC | Age: 34
End: 2024-10-23
Payer: MEDICAID

## 2024-11-07 ENCOUNTER — APPOINTMENT (OUTPATIENT)
Dept: ORTHOPEDIC SURGERY | Facility: CLINIC | Age: 34
End: 2024-11-07
Payer: MEDICAID

## 2024-11-27 ENCOUNTER — APPOINTMENT (OUTPATIENT)
Dept: ORTHOPEDIC SURGERY | Facility: CLINIC | Age: 34
End: 2024-11-27
Payer: MEDICAID

## 2024-12-10 ENCOUNTER — EVALUATION (OUTPATIENT)
Dept: OCCUPATIONAL THERAPY | Facility: HOSPITAL | Age: 34
End: 2024-12-10
Payer: MEDICAID

## 2024-12-10 DIAGNOSIS — S46.012A STRAIN OF MUSCLE(S) AND TENDON(S) OF THE ROTATOR CUFF OF LEFT SHOULDER, INITIAL ENCOUNTER: ICD-10-CM

## 2024-12-10 DIAGNOSIS — G89.29 OTHER CHRONIC PAIN: Primary | ICD-10-CM

## 2024-12-10 DIAGNOSIS — S46.009D UNSPECIFIED INJURY OF MUSCLE(S) AND TENDON(S) OF THE ROTATOR CUFF OF UNSPECIFIED SHOULDER, SUBSEQUENT ENCOUNTER: ICD-10-CM

## 2024-12-10 DIAGNOSIS — M25.512 PAIN IN LEFT SHOULDER: ICD-10-CM

## 2024-12-10 PROCEDURE — 97110 THERAPEUTIC EXERCISES: CPT | Mod: GO

## 2024-12-10 PROCEDURE — 97165 OT EVAL LOW COMPLEX 30 MIN: CPT | Mod: GO

## 2024-12-10 ASSESSMENT — ENCOUNTER SYMPTOMS
OCCASIONAL FEELINGS OF UNSTEADINESS: 0
LOSS OF SENSATION IN FEET: 0
DEPRESSION: 0

## 2024-12-10 NOTE — PROGRESS NOTES
Occupational Therapy Evaluation    Patient Name: Mumtaz Romo  MRN: [id number]  Today's Date: 12/10/2024  Time Calculation  Start Time: 0115  Stop Time: 0200  Time Calculation (min): 45 min  Problem List Items Addressed This Visit             ICD-10-CM    Unspecified injury of muscle(s) and tendon(s) of the rotator cuff of unspecified shoulder, subsequent encounter S46.009D     Other Visit Diagnoses         Codes    Pain in left shoulder     M25.512    Other chronic pain     G89.29    Strain of muscle(s) and tendon(s) of the rotator cuff of left shoulder, initial encounter     S46.012A             Insurance  Visit 1  of  Authorization: required  Insurance plan: Payor: [employer] APRIL / Plan: [employer] PLAN / Product Type: *No Product type* /   Medicare Certification: 12/10/2024            Endin25    Assessment: Mumtaz Romo is a 33 yo man and father with chronic intermittent left shoulder pain. He reports moderate to severe pain and tingling as well as weakness, and demonstrates limited motion, all of which make I/ADL difficult. I got him started on a home program that addresses these issues. He will benefit from ongoing occupational therapy in order to get back to safe I/ADL performance and his prior level of function.      Plan: assess posture and shoulder strength; therapeutic exercise, therapeutic activity, self-care, home management, manual therapy, neuromuscular coordination, vasopneumatic, electric stimulation, fluidotherapy, ultrasound, kinesiotaping, orthosis fabrication, wound/scar/edema care  Goals  In 8-10 weeks, Mumtaz will achieve the following goals:  He will be able to perform self-care, , household, work, and leisure tasks with lower pain (1-3/10), 75% of the time.  He will improve left shoulder AROM in order to fully perform self-care, , household, work and leisure tasks:    Flexion/Abduction 120 degrees.  He will regain 4+/5 to  5/5 left shoulder strength in order to fully perform self-care, , household, work, and leisure tasks.  He will decrease his QuickDASH score by 20-30 points (43.18 at eval).   Patient's goals for therapy: for random shoulder pain to stop    Plan of care was developed with input and agreement by the patient  Frequency: 1 x Week  Duration: 12 Weeks    Precautions:  Movement Restrictions: No:  Weight Bearing Status: As tolerated    Subjective   Has to have therapy for L shoulder to get MRI. L shoulder pain started 3-4 years ago out of the blue.  Had PT but wasn't able to finish it.    Prior level of function   Normal pain free LUE    Patient Intake and Medical History form reviewed    Pain  0/10 at rest now, but can have a 10 min burst of pain even when at rest     Objective   Outcome Measure: QuickDASH: 43.18    Wound/scar: none    Edema: n/a    Sensation  Normal now, occasional pins and needles at anterior L shoulder close to axilla, less often at posterior shoulder, sometimes about L elbow    Neuro exam:  Radial nerve: 5/5 strength in thumb extension, sensation intact to dorsal surface of thumb/index web space  Median nerve: 4/5 strength in thumb abduction and opposition, sensation intact to palmar surface of index finger  Ulnar nerve: 4-/5 strength in thumb/little finger approximation, sensation intact to palmar surface of little finger distal to PIP joint     AROM LUE  Shoulder:   flexion 78 pain stopped motion (full passively)  extension 54   abduction 104 pain stopped motion (full passively)  external rotation 72  internal rotation T8     Elbow/forearm/hand WNL     Strength LUE TBE  Shoulder:  abduction /5  adduction /5  external rotation with shoulder at side /5  internal rotation with shoulder at side /5  flexion /5  extension /5    Hand:   strength (pos 2):     L 48, 66, 70, R 80 lbs  pinch strength: 3 point: L 21.5, R 25.5 lbs                            key:      L 15,    R 22.5     Special  Tests  Elbow:  NEG Phalen's at L cubital tunnel, NEG flexed elbow test for cubital tunnel syndrome      Treatment  Education: home exercise program, plan of care, activity modification, pain management, pertinent anatomy, and how to make his own heating pad     Modalities: Moist heat to L shoulder for pain relief  Therapeutic Exercise: Instructed Mumtaz in his home program: moist heat followed by passive shoulder flexion (counter top or wall) and ER, and brachial plexus gliding, which he performed correctly; 2 sets of 10 twice a day        OT Evaluation Time Entry  OT Evaluation (Low) Time Entry: 25  OT Therapeutic Procedures Time Entry  Therapeutic Exercise Time Entry: 10  OT Modalities Time Entry  Hot/Cold Pack Time Entry: 10 (not charged)

## 2024-12-17 ENCOUNTER — OFFICE VISIT (OUTPATIENT)
Dept: ORTHOPEDIC SURGERY | Facility: CLINIC | Age: 34
End: 2024-12-17
Payer: MEDICAID

## 2024-12-17 VITALS — BODY MASS INDEX: 25.26 KG/M2 | WEIGHT: 151.6 LBS | HEIGHT: 65 IN

## 2024-12-17 DIAGNOSIS — S46.012A ROTATOR CUFF STRAIN, LEFT, INITIAL ENCOUNTER: ICD-10-CM

## 2024-12-17 DIAGNOSIS — S46.009D UNSPECIFIED INJURY OF MUSCLE(S) AND TENDON(S) OF THE ROTATOR CUFF OF UNSPECIFIED SHOULDER, SUBSEQUENT ENCOUNTER: ICD-10-CM

## 2024-12-17 DIAGNOSIS — M25.512 CHRONIC LEFT SHOULDER PAIN: Primary | ICD-10-CM

## 2024-12-17 DIAGNOSIS — G89.29 CHRONIC LEFT SHOULDER PAIN: Primary | ICD-10-CM

## 2024-12-17 PROCEDURE — 3008F BODY MASS INDEX DOCD: CPT | Performed by: PHYSICIAN ASSISTANT

## 2024-12-17 PROCEDURE — 99213 OFFICE O/P EST LOW 20 MIN: CPT | Performed by: PHYSICIAN ASSISTANT

## 2024-12-17 ASSESSMENT — PAIN - FUNCTIONAL ASSESSMENT: PAIN_FUNCTIONAL_ASSESSMENT: 0-10

## 2024-12-17 ASSESSMENT — PAIN DESCRIPTION - DESCRIPTORS: DESCRIPTORS: SORE;ACHING

## 2024-12-17 ASSESSMENT — ENCOUNTER SYMPTOMS
DEPRESSION: 0
LOSS OF SENSATION IN FEET: 0
OCCASIONAL FEELINGS OF UNSTEADINESS: 0

## 2024-12-17 ASSESSMENT — PAIN SCALES - GENERAL: PAINLEVEL_OUTOF10: 5 - MODERATE PAIN

## 2024-12-17 NOTE — PROGRESS NOTES
Subjective      Chief Complaint   Patient presents with    Left Shoulder - Pain        Past Surgical History:   Procedure Laterality Date    OTHER SURGICAL HISTORY  10/02/2020    No history of surgery        HPI  This 34 year old patient presents today with left shoulder pain (6/10). The patient states that the left shoulder pain has been present for years. The patient played football in high school and injured his shoulder at that time. He has been experiencing intermittent chronic pain since that time. The patient states that the left shoulder pain is worse with and aggravated by reaching and lifting. The patient states that this shoulder pain is disabling and presents today to discuss further options. The patient states that they have tried tylenol with no relief. He is allergic to NSAIDS. He is participating in physical therapy and occupational therapy with no relief of the left shoulder pain.   He had previously attended physical therapy in [place] several years ago with no relief in symptoms.     CARDIOLOGY:   Negative for chest pain, shortness of breath.   RESPIRATORY:   Negative for chest pain, shortness of breath.   MUSCULOSKELETAL:   See HPI for details.   NEUROLOGY:   Negative for tingling, numbness, weakness.    Objective      There were no vitals taken for this visit.     SHOULDER EXAM  Constitutional: Appears stated age. No apparent distress  Labored Breathing: No  Psychiatric: Normal mood and effect.   Neurological: alert and oriented x3  Skin: intact  HEENT: No bruising, otorrhea, rhinorrhea.  MUSCULOSKELETAL: Neck: No tenderness. No pain or limitation with range of motion. Back: No tenderness. Straight leg test negative bilaterally. Left shoulder: There is tenderness anteriorly and laterally. Active abduction and active flexion are 0-120 degrees but with pain and guarding. There is pain with and limitation of active and passive internal and external rotation. Comparments are soft. Neurovascular is  intact.     AP and lateral x-rays of the left shoulder done and read in office on  9- show no evidence of acute fracture or bone destruction.     Mumtaz was seen today for pain.  Diagnoses and all orders for this visit:  Chronic left shoulder pain (Primary)  -     Cancel: MR shoulder left wo IV contrast; Future  -     MR shoulder left wo IV contrast; Future  Unspecified injury of muscle(s) and tendon(s) of the rotator cuff of unspecified shoulder, subsequent encounter  -     Cancel: MR shoulder left wo IV contrast; Future  -     MR shoulder left wo IV contrast; Future  Rotator cuff strain, left, initial encounter  -     Cancel: MR shoulder left wo IV contrast; Future  -     MR shoulder left wo IV contrast; Future  Options are discussed with the patient in detail. The patient is given a referral for left shoulder MRI in the office today. He is instructed to continue physical therapy. The patient is instructed regarding activity modification and risk for further injury with falling or trauma, ice, provider directed at home gentle strengthening and ROM exercises, and the appropriate use of Tylenol as needed for pain with its potential adverse reactions and side effects. The patient understands.  Return after MRI is complete or sooner as needed please note that this report has been produced using speech recognition software.  It may contain errors related to grammar, punctuation or spelling.  Electronically signed, but not reviewed.    Anu Hoskins PA-C

## 2024-12-17 NOTE — PATIENT INSTRUCTIONS
Thank you for coming to see us today!     Continue to rest, ice, and elevate  Tylenol for pain control  We are ordering an MRI in office today.     Please call central scheduling to schedule your MRI  Once you have a date for your MRI, call our office to schedule a follow up with Dr. Williamson

## 2024-12-31 ENCOUNTER — TREATMENT (OUTPATIENT)
Dept: OCCUPATIONAL THERAPY | Facility: HOSPITAL | Age: 34
End: 2024-12-31
Payer: MEDICAID

## 2024-12-31 DIAGNOSIS — M25.512 PAIN IN LEFT SHOULDER: ICD-10-CM

## 2024-12-31 DIAGNOSIS — G89.29 OTHER CHRONIC PAIN: ICD-10-CM

## 2024-12-31 DIAGNOSIS — S46.012A STRAIN OF MUSCLE(S) AND TENDON(S) OF THE ROTATOR CUFF OF LEFT SHOULDER, INITIAL ENCOUNTER: ICD-10-CM

## 2024-12-31 DIAGNOSIS — S46.009D UNSPECIFIED INJURY OF MUSCLE(S) AND TENDON(S) OF THE ROTATOR CUFF OF UNSPECIFIED SHOULDER, SUBSEQUENT ENCOUNTER: ICD-10-CM

## 2024-12-31 PROCEDURE — 97140 MANUAL THERAPY 1/> REGIONS: CPT | Mod: GO

## 2024-12-31 PROCEDURE — 97110 THERAPEUTIC EXERCISES: CPT | Mod: GO

## 2024-12-31 NOTE — PROGRESS NOTES
Occupational Therapy Treatment    Patient Name: Mumtaz Romo  MRN: [id number]  Today's Date: 2024  Time Calculation  Start Time: 1000  Stop Time: 1045  Time Calculation (min): 45 min  Problem List Items Addressed This Visit             ICD-10-CM    Unspecified injury of muscle(s) and tendon(s) of the rotator cuff of unspecified shoulder, subsequent encounter S46.009D     Other Visit Diagnoses         Codes    Pain in left shoulder     M25.512    Other chronic pain     G89.29    Strain of muscle(s) and tendon(s) of the rotator cuff of left shoulder, initial encounter     S46.012A             Insurance  Visit 2   (12/10/24 - 3/7/25)  Authorization: required  Insurance plan: Payor: [employer] APRIL / Plan: [employer] [facility] PLAN / Product Type: *No Product type* /   Medicare Certification: 12/10/2024            Endin25     Assessment: Will improved his active L shoulder flexion and extension, lost motion in abduction (motion stopped by pain)     Plan: therapeutic exercise, therapeutic activity, self-care, home management, manual therapy, neuromuscular coordination, vasopneumatic, electric stimulation, kinesiotaping   Goals  In 8-10 weeks, Mumtaz will achieve the following goals:  He will be able to perform self-care, , household, work, and leisure tasks with lower pain (1-3/10), 75% of the time.  He will improve left shoulder AROM in order to fully perform self-care, , household, work and leisure tasks:    Flexion/Abduction 120 degrees.  He will regain 4+/5 to 5/5 left shoulder strength in order to fully perform self-care, , household, work, and leisure tasks.  He will decrease his QuickDASH score by 20-30 points (43.18 at eval).   Patient's goals for therapy: for random shoulder pain to stop    Plan of care was developed with input and agreement by the patient  Frequency: 1 x Week  Duration: 12 Weeks    Precautions:  Movement Restrictions:  No:  Weight Bearing Status: As tolerated    Subjective   Has done HEP twice a day. Hasn't had bursts of pain in a month, but has pain 3-4 times throughout the day. He's getting and MRI this week.    Prior level of function   Normal pain free LUE (uears ago)    Patient Intake and Medical History form reviewed    Pain  0/10 at rest now, but can have a 10 min burst of pain even when at rest     Objective   Outcome Measure: QuickDASH: 43.18    Wound/scar: none    Edema: n/a    Sensation  Normal now, occasional pins and needles at anterior L shoulder close to axilla, less often at posterior shoulder, sometimes about L elbow    Neuro exam:  Radial nerve: 5/5 strength in thumb extension, sensation intact to dorsal surface of thumb/index web space  Median nerve: 4/5 strength in thumb abduction and opposition, sensation intact to palmar surface of index finger  Ulnar nerve: 4-/5 strength in thumb/little finger approximation, sensation intact to palmar surface of little finger distal to PIP joint     AROM LUE  Shoulder:   flexion 138   extension 66   abduction 84 pain stopped motion (full passively)  external rotation 72  internal rotation T8     Elbow/forearm/hand WNL     Strength   Shoulder: L, R  abduction 4/5 , 5/5  external rotation with shoulder at side 3+/5 bilat  internal rotation with shoulder at side 3+/5 bilat  flexion 4+/5 bilat  extension 4/5 bilat    Hand:   strength (pos 2):     L 48, 66, 70, R 80 lbs  pinch strength: 3 point: L 21.5, R 25.5 lbs                            key:      L 15,    R 22.5     Posture  Mildly rounded shoulders, L scapula wings very slightly    Special Tests  Elbow:  NEG Phalen's at L cubital tunnel, NEG flexed elbow test for cubital tunnel syndrome      Treatment  Education: home exercise program, plan of care, activity modification, pain management, pertinent anatomy, and how to make his own heating pad     Modalities: Moist heat to L shoulder for pain relief and to prepare for  treatment. Heat again after treatment for soreness.  Manual: STM and trigger point releases about L shoulder for pain relief and to relax tissues to allow greater joint motion  Therapeutic Exercise: Reassessed AROM, symptoms, functional status, HEP and compliance, assessed shoulder strength.  Instructed Will in active scapular motion (elevation, depression, retraction, combined downward rotation and retraction), added to HEP.      HEP: moist heat followed by passive shoulder flexion (counter top or wall) and ER, and brachial plexus gliding, scapular motion (elevation, depression, retraction, combined downward rotation and retraction); 2 sets of 10 twice a day     OT Therapeutic Procedures Time Entry  Manual Therapy Time Entry: 10  Therapeutic Exercise Time Entry: 15  OT Modalities Time Entry  Hot/Cold Pack Time Entry: 20 (not charged)

## 2025-01-03 ENCOUNTER — APPOINTMENT (OUTPATIENT)
Dept: RADIOLOGY | Facility: HOSPITAL | Age: 35
End: 2025-01-03
Payer: MEDICAID

## 2025-01-08 ENCOUNTER — APPOINTMENT (OUTPATIENT)
Dept: OCCUPATIONAL THERAPY | Facility: HOSPITAL | Age: 35
End: 2025-01-08
Payer: MEDICAID

## 2025-01-09 ENCOUNTER — TREATMENT (OUTPATIENT)
Dept: OCCUPATIONAL THERAPY | Facility: HOSPITAL | Age: 35
End: 2025-01-09
Payer: MEDICAID

## 2025-01-09 DIAGNOSIS — G89.29 OTHER CHRONIC PAIN: ICD-10-CM

## 2025-01-09 DIAGNOSIS — M25.512 PAIN IN LEFT SHOULDER: ICD-10-CM

## 2025-01-09 DIAGNOSIS — S46.012A STRAIN OF MUSCLE(S) AND TENDON(S) OF THE ROTATOR CUFF OF LEFT SHOULDER, INITIAL ENCOUNTER: ICD-10-CM

## 2025-01-09 DIAGNOSIS — S46.009D UNSPECIFIED INJURY OF MUSCLE(S) AND TENDON(S) OF THE ROTATOR CUFF OF UNSPECIFIED SHOULDER, SUBSEQUENT ENCOUNTER: ICD-10-CM

## 2025-01-09 PROCEDURE — 97530 THERAPEUTIC ACTIVITIES: CPT | Mod: GO

## 2025-01-09 PROCEDURE — 97110 THERAPEUTIC EXERCISES: CPT | Mod: GO

## 2025-01-09 NOTE — PROGRESS NOTES
Occupational Therapy Treatment    Patient Name: Mumtaz Romo  MRN: [id number]  Today's Date: 2025  Time Calculation  Start Time: 0200  Stop Time: 0245  Time Calculation (min): 45 min  Problem List Items Addressed This Visit             ICD-10-CM    Unspecified injury of muscle(s) and tendon(s) of the rotator cuff of unspecified shoulder, subsequent encounter S46.009D     Other Visit Diagnoses         Codes    Pain in left shoulder     M25.512    Other chronic pain     G89.29    Strain of muscle(s) and tendon(s) of the rotator cuff of left shoulder, initial encounter     S46.012A             Insurance  Visit 3   (12/10/24 - 3/7/25)  Authorization: required  Insurance plan: Payor: [employer] APRIL / Plan: [employer] PLAN / Product Type: *No Product type* /   Medicare Certification: 12/10/2024            Endin25     Assessment: Will better able to actively move his L shoulder today.n     Plan: therapeutic exercise, therapeutic activity, self-care, home management, manual therapy, neuromuscular coordination, vasopneumatic, electric stimulation, kinesiotaping   Goals  In 8-10 weeks, Mumtaz will achieve the following goals:  He will be able to perform self-care, , household, work, and leisure tasks with lower pain (1-3/10), 75% of the time.  He will improve left shoulder AROM in order to fully perform self-care, , household, work and leisure tasks:    Flexion/Abduction 120 degrees.  He will regain 4+/5 to 5/5 left shoulder strength in order to fully perform self-care, , household, work, and leisure tasks.  He will decrease his QuickDASH score by 20-30 points (43.18 at eval).   Patient's goals for therapy: for random shoulder pain to stop    Plan of care was developed with input and agreement by the patient  Frequency: 1 x Week  Duration: 12 Weeks    Precautions:  Movement Restrictions: No:  Weight Bearing Status: As tolerated    Subjective  "  \"I'm high. I don't feel no pain right now\". MRI was rescheduled.    Prior level of function   Normal pain free LUE (years ago)    Patient Intake and Medical History form reviewed    Pain  0/10 at rest now, but can have a 10 min burst of pain even when at rest.  Anterior, posterior, inferior R shoulder    Objective   Outcome Measure: QuickDASH: 43.18    Wound/scar: none    Edema: n/a    Sensation  Normal now, occasional pins and needles at anterior L shoulder close to axilla, less often at posterior shoulder, sometimes about L elbow    Neuro exam:  Radial nerve: 5/5 strength in thumb extension, sensation intact to dorsal surface of thumb/index web space  Median nerve: 4/5 strength in thumb abduction and opposition, sensation intact to palmar surface of index finger  Ulnar nerve: 4-/5 strength in thumb/little finger approximation, sensation intact to palmar surface of little finger distal to PIP joint     AROM LUE  Shoulder:   flexion 138   extension 78   abduction 122  external rotation 72  internal rotation T8     Elbow/forearm/hand WNL     Strength   Shoulder: L, R  abduction 4/5 , 5/5  external rotation with shoulder at side 3+/5 bilat  internal rotation with shoulder at side 3+/5 bilat  flexion 4+/5 bilat  extension 4/5 bilat    Hand:   strength (pos 2):     L 48, 66, 70, R 80 lbs  pinch strength: 3 point: L 21.5, R 25.5 lbs                            key:      L 15,    R 22.5     Posture  Mildly rounded shoulders, L scapula wings very slightly    Special Tests RUE  Elbow:  NEG Phalen's at L cubital tunnel, NEG flexed elbow test for cubital tunnel syndrome  Shoulder:   NEG Neer's and Hawkin's (impingement), NEG Carson's (ER more painful than IR) (SLAP tear).    Treatment  Education: home exercise program, plan of care, activity modification, pain management, pertinent anatomy, and how to make his own heating pad     Modalities: Moist heat to L shoulder and thorax in sitting for pain relief and to prepare " for treatment.   Therapeutic Exercise: Reassessed AROM, symptoms, functional status, HEP and compliance, special testing for shoulder impingement and labral issues. Instructed Will in shoulder strengthening: internal rotation, external rotation, rowing, with blue theraband (issued), which he performed correctly; added to HEP.  Therapeutic Activity: Dart throwing with affected UE as full arm dynamic functional task requiring shoulder external rotation and flexion.    HEP: moist heat followed by passive shoulder flexion (counter top or wall) and ER, and brachial plexus gliding, scapular motion (elevation, depression, retraction, combined downward rotation and retraction), shoulder strengthening: internal rotation, external rotation, rowing, with blue theraband; 2 sets of 10 twice a day       OT Therapeutic Procedures Time Entry  Therapeutic Activity Time Entry: 15  Therapeutic Exercise Time Entry: 18  OT Modalities Time Entry  Hot/Cold Pack Time Entry: 12 (not charged)

## 2025-01-16 ENCOUNTER — APPOINTMENT (OUTPATIENT)
Dept: OCCUPATIONAL THERAPY | Facility: HOSPITAL | Age: 35
End: 2025-01-16
Payer: MEDICAID

## 2025-01-17 ENCOUNTER — HOSPITAL ENCOUNTER (OUTPATIENT)
Dept: RADIOLOGY | Facility: HOSPITAL | Age: 35
Discharge: HOME | End: 2025-01-17
Payer: MEDICAID

## 2025-01-17 DIAGNOSIS — G89.29 CHRONIC LEFT SHOULDER PAIN: ICD-10-CM

## 2025-01-17 DIAGNOSIS — S46.009D UNSPECIFIED INJURY OF MUSCLE(S) AND TENDON(S) OF THE ROTATOR CUFF OF UNSPECIFIED SHOULDER, SUBSEQUENT ENCOUNTER: ICD-10-CM

## 2025-01-17 DIAGNOSIS — M25.512 CHRONIC LEFT SHOULDER PAIN: ICD-10-CM

## 2025-01-17 DIAGNOSIS — S46.012A ROTATOR CUFF STRAIN, LEFT, INITIAL ENCOUNTER: ICD-10-CM

## 2025-01-17 PROCEDURE — 73221 MRI JOINT UPR EXTREM W/O DYE: CPT | Mod: LT

## 2025-01-23 ENCOUNTER — APPOINTMENT (OUTPATIENT)
Dept: OCCUPATIONAL THERAPY | Facility: HOSPITAL | Age: 35
End: 2025-01-23
Payer: MEDICAID